# Patient Record
Sex: FEMALE | Race: WHITE | NOT HISPANIC OR LATINO | Employment: OTHER | URBAN - METROPOLITAN AREA
[De-identification: names, ages, dates, MRNs, and addresses within clinical notes are randomized per-mention and may not be internally consistent; named-entity substitution may affect disease eponyms.]

---

## 2021-06-01 ENCOUNTER — OFFICE VISIT (OUTPATIENT)
Dept: GASTROENTEROLOGY | Facility: CLINIC | Age: 71
End: 2021-06-01
Payer: MEDICARE

## 2021-06-01 VITALS
HEART RATE: 56 BPM | WEIGHT: 145 LBS | DIASTOLIC BLOOD PRESSURE: 73 MMHG | SYSTOLIC BLOOD PRESSURE: 122 MMHG | BODY MASS INDEX: 26.68 KG/M2 | HEIGHT: 62 IN

## 2021-06-01 DIAGNOSIS — K44.9 HIATAL HERNIA: ICD-10-CM

## 2021-06-01 DIAGNOSIS — K22.70 BARRETT'S ESOPHAGUS WITHOUT DYSPLASIA: ICD-10-CM

## 2021-06-01 DIAGNOSIS — K63.5 HYPERPLASTIC POLYP OF DESCENDING COLON: ICD-10-CM

## 2021-06-01 DIAGNOSIS — K57.90 DIVERTICULAR DISEASE: Primary | ICD-10-CM

## 2021-06-01 PROCEDURE — 99214 OFFICE O/P EST MOD 30 MIN: CPT | Performed by: INTERNAL MEDICINE

## 2021-06-01 RX ORDER — FLUTICASONE PROPIONATE 50 MCG
1 SPRAY, SUSPENSION (ML) NASAL DAILY
COMMUNITY

## 2021-06-01 RX ORDER — FAMOTIDINE 10 MG
10 TABLET ORAL DAILY
COMMUNITY
End: 2022-02-04 | Stop reason: SDUPTHER

## 2021-06-01 RX ORDER — CETIRIZINE HYDROCHLORIDE 10 MG/1
10 TABLET ORAL DAILY
COMMUNITY

## 2021-06-01 RX ORDER — MONTELUKAST SODIUM 10 MG/1
10 TABLET ORAL
COMMUNITY

## 2021-06-01 RX ORDER — ATORVASTATIN CALCIUM 10 MG/1
10 TABLET, FILM COATED ORAL DAILY
COMMUNITY
Start: 2020-09-10 | End: 2022-02-04 | Stop reason: SINTOL

## 2021-06-01 NOTE — PROGRESS NOTES
[facility] - Outpatient Follow-up Note  Frieda Sullivan 80 y o  female MRN: [id number]  Encounter: [id number]          ASSESSMENT AND PLAN:      1  Diverticular disease    2  Gil's esophagus without dysplasia    3  Hiatal hernia    4  Hyperplastic polyp of descending colon       History of acid reflux well managed on small dose of acid suppression with Pepcid 10 mg a day  Had a small hiatal hernia of small area of Gil's mucosa, will be due for a follow-up EGD next year  Prior EGD colonoscopy biopsy results were reviewed  Continue same, any problems sooner may call   ______________________________________________________________________    SUBJECTIVE:     67-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, history of heartburn acid reflux and indigestion, area small area of Gil's esophagus, switched from PPI to    Pepcid 10 mg in the morning with adequate management of her symptoms  She is aggressively managing her lifestyle and diet  Appetite is fair, denies dysphagia coughing choking spells nocturnal reflux regurgitation bronchitis pneumonias  Denies any fever chills rash chest pains shortness of breath  Reasonably active alert  Diet medications more than 10 pertinent systems reviewed  REVIEW OF SYSTEMS IS OTHERWISE NEGATIVE  Historical Information   Past Medical History:   Diagnosis Date    GERD (gastroesophageal reflux disease)     Nasal polyps      Past Surgical History:   Procedure Laterality Date    CHOLECYSTECTOMY      COLONOSCOPY      UPPER GASTROINTESTINAL ENDOSCOPY       Social History   Social History     Substance and Sexual Activity   Alcohol Use None     Social History     Substance and Sexual Activity   Drug Use Not on file     Social History     Tobacco Use   Smoking Status Former Smoker   Smokeless Tobacco Former User     History reviewed  No pertinent family history      Meds/Allergies       Current Outpatient Medications:     atorvastatin (LIPITOR) 10 mg tablet    budesonide (PULMICORT) 90 MCG/ACT inhaler    cetirizine (ZyrTEC) 10 mg tablet    famotidine (PEPCID) 10 mg tablet    fluticasone (FLONASE) 50 mcg/act nasal spray    montelukast (SINGULAIR) 10 mg tablet    No Known Allergies        Objective     Blood pressure 122/73, pulse 56, height 5' 2" (1 575 m), weight 65 8 kg (145 lb)  Body mass index is 26 52 kg/m²  PHYSICAL EXAM:      General Appearance:   Alert, cooperative, no distress   HEENT:   Normocephalic, atraumatic, anicteric  Neck:  Supple, symmetrical, trachea midline   Lungs:   Clear to auscultation bilaterally; no rales, rhonchi or wheezing; respirations unlabored    Heart[de-identified]   Regular rate and rhythm; no murmur  Abdomen:   Soft, non-tender, non-distended; normal bowel sounds; no masses, no organomegaly    Genitalia:   Deferred    Rectal:   Deferred    Extremities:  No cyanosis, clubbing or edema    Skin:  No jaundice, rashes, or lesions    Lymph nodes:  No palpable cervical lymphadenopathy        Lab Results:   No visits with results within 1 Day(s) from this visit  Latest known visit with results is:   No results found for any previous visit  Radiology Results:   No results found

## 2021-12-30 ENCOUNTER — PREP FOR PROCEDURE (OUTPATIENT)
Dept: GASTROENTEROLOGY | Facility: CLINIC | Age: 71
End: 2021-12-30

## 2021-12-30 DIAGNOSIS — K21.9 GASTROESOPHAGEAL REFLUX DISEASE, UNSPECIFIED WHETHER ESOPHAGITIS PRESENT: ICD-10-CM

## 2021-12-30 DIAGNOSIS — K22.70 BARRETT'S ESOPHAGUS WITHOUT DYSPLASIA: Primary | ICD-10-CM

## 2022-02-04 ENCOUNTER — HOSPITAL ENCOUNTER (OUTPATIENT)
Dept: GASTROENTEROLOGY | Facility: AMBULATORY SURGERY CENTER | Age: 72
Discharge: HOME | End: 2022-02-04
Payer: MEDICARE

## 2022-02-04 ENCOUNTER — ANESTHESIA EVENT (OUTPATIENT)
Dept: GASTROENTEROLOGY | Facility: AMBULATORY SURGERY CENTER | Age: 72
End: 2022-02-04

## 2022-02-04 ENCOUNTER — ANESTHESIA (OUTPATIENT)
Dept: GASTROENTEROLOGY | Facility: AMBULATORY SURGERY CENTER | Age: 72
End: 2022-02-04

## 2022-02-04 VITALS
HEART RATE: 59 BPM | DIASTOLIC BLOOD PRESSURE: 65 MMHG | RESPIRATION RATE: 18 BRPM | HEIGHT: 63 IN | SYSTOLIC BLOOD PRESSURE: 101 MMHG | WEIGHT: 142 LBS | BODY MASS INDEX: 25.16 KG/M2 | OXYGEN SATURATION: 98 % | TEMPERATURE: 95.7 F

## 2022-02-04 DIAGNOSIS — K22.70 BARRETT'S ESOPHAGUS WITHOUT DYSPLASIA: ICD-10-CM

## 2022-02-04 DIAGNOSIS — K21.9 GASTROESOPHAGEAL REFLUX DISEASE, UNSPECIFIED WHETHER ESOPHAGITIS PRESENT: ICD-10-CM

## 2022-02-04 PROCEDURE — 88305 TISSUE EXAM BY PATHOLOGIST: CPT | Performed by: PATHOLOGY

## 2022-02-04 PROCEDURE — 43239 EGD BIOPSY SINGLE/MULTIPLE: CPT | Performed by: INTERNAL MEDICINE

## 2022-02-04 PROCEDURE — 00731 ANES UPR GI NDSC PX NOS: CPT | Performed by: NURSE ANESTHETIST, CERTIFIED REGISTERED

## 2022-02-04 RX ORDER — SODIUM CHLORIDE 9 MG/ML
30 INJECTION, SOLUTION INTRAVENOUS CONTINUOUS
Status: DISCONTINUED | OUTPATIENT
Start: 2022-02-04 | End: 2022-02-08 | Stop reason: HOSPADM

## 2022-02-04 RX ORDER — LIDOCAINE HYDROCHLORIDE 10 MG/ML
INJECTION, SOLUTION EPIDURAL; INFILTRATION; INTRACAUDAL; PERINEURAL AS NEEDED
Status: DISCONTINUED | OUTPATIENT
Start: 2022-02-04 | End: 2022-02-04

## 2022-02-04 RX ORDER — SODIUM CHLORIDE 9 MG/ML
20 INJECTION, SOLUTION INTRAVENOUS CONTINUOUS
Status: DISCONTINUED | OUTPATIENT
Start: 2022-02-04 | End: 2022-02-08 | Stop reason: HOSPADM

## 2022-02-04 RX ORDER — SODIUM CHLORIDE 9 MG/ML
INJECTION, SOLUTION INTRAVENOUS CONTINUOUS PRN
Status: DISCONTINUED | OUTPATIENT
Start: 2022-02-04 | End: 2022-02-04

## 2022-02-04 RX ORDER — ALBUTEROL SULFATE 90 UG/1
2 AEROSOL, METERED RESPIRATORY (INHALATION) EVERY 6 HOURS PRN
COMMUNITY
Start: 2021-09-14

## 2022-02-04 RX ORDER — PROPOFOL 10 MG/ML
INJECTION, EMULSION INTRAVENOUS AS NEEDED
Status: DISCONTINUED | OUTPATIENT
Start: 2022-02-04 | End: 2022-02-04

## 2022-02-04 RX ORDER — FAMOTIDINE 10 MG
TABLET ORAL DAILY
COMMUNITY

## 2022-02-04 RX ORDER — DIPHENOXYLATE HYDROCHLORIDE AND ATROPINE SULFATE 2.5; .025 MG/1; MG/1
1 TABLET ORAL DAILY
COMMUNITY

## 2022-02-04 RX ADMIN — SODIUM CHLORIDE: 9 INJECTION, SOLUTION INTRAVENOUS at 10:50

## 2022-02-04 RX ADMIN — PROPOFOL 50 MG: 10 INJECTION, EMULSION INTRAVENOUS at 10:54

## 2022-02-04 RX ADMIN — PROPOFOL 50 MG: 10 INJECTION, EMULSION INTRAVENOUS at 11:00

## 2022-02-04 RX ADMIN — PROPOFOL 50 MG: 10 INJECTION, EMULSION INTRAVENOUS at 10:57

## 2022-02-04 RX ADMIN — PROPOFOL 50 MG: 10 INJECTION, EMULSION INTRAVENOUS at 10:53

## 2022-02-04 RX ADMIN — PROPOFOL 50 MG: 10 INJECTION, EMULSION INTRAVENOUS at 10:55

## 2022-02-04 RX ADMIN — LIDOCAINE HYDROCHLORIDE 50 MG: 10 INJECTION, SOLUTION EPIDURAL; INFILTRATION; INTRACAUDAL; PERINEURAL at 10:52

## 2022-02-04 NOTE — H&P
History and Physical - [facility]  Nargis Monday 80 y o  female MRN: [id number]                  HPI: Nargis Monday is a 80y o  year old female who presents for history of GERD and Gil's      REVIEW OF SYSTEMS: Per the HPI, and otherwise unremarkable  Historical Information   Past Medical History:   Diagnosis Date    Asthma     Breathing difficulty     Breathing issue in intra op due to asthma- general anes related    Colon polyp     GERD (gastroesophageal reflux disease)     History of Gil's esophagus     Hx of nasal polyp     Nasal polyps     Post-nasal drip      Past Surgical History:   Procedure Laterality Date    BREAST IMPLANT      CHOLECYSTECTOMY      COLONOSCOPY      KNEE SURGERY Right     withhardware= pin    SINUS SURGERY      avoid left nares if nasal airway needed    UPPER GASTROINTESTINAL ENDOSCOPY      WISDOM TOOTH EXTRACTION       Social History   Social History     Substance and Sexual Activity   Alcohol Use Yes    Comment: social- rare     Social History     Substance and Sexual Activity   Drug Use Never     Social History     Tobacco Use   Smoking Status Never Smoker   Smokeless Tobacco Never Used     History reviewed  No pertinent family history      Meds/Allergies       Current Outpatient Medications:     budesonide (PULMICORT) 90 MCG/ACT inhaler    CALCIUM-MAGNESIUM-ZINC PO    cetirizine (ZyrTEC) 10 mg tablet    famotidine (PEPCID) 10 mg tablet    fluticasone (FLONASE) 50 mcg/act nasal spray    montelukast (SINGULAIR) 10 mg tablet    multivitamin (THERAGRAN) TABS    Turmeric (QC TUMERIC COMPLEX PO)    albuterol (PROVENTIL HFA,VENTOLIN HFA) 90 mcg/act inhaler    Current Facility-Administered Medications:     sodium chloride 0 9 % infusion, 30 mL/hr, Intravenous, Continuous    Facility-Administered Medications Ordered in Other Encounters:     lidocaine (PF) (XYLOCAINE-MPF) 1 % injection, , Intravenous, PRN, 50 mg at 02/04/22 4899   sodium chloride 0 9 % infusion, , Intravenous, Continuous PRN, New Bag at 02/04/22 1050    Allergies   Allergen Reactions    Buckwheat - Food Allergy Hives     NEEDS EPI PEN    Atorvastatin Other (See Comments)     Abn LFT    Latex Swelling     OF FACE, TONGUE - UNSURE IF TRULY ANAPHYLAXIS REACTION       Objective     /72   Pulse 72   Temp (!) 95 7 °F (35 4 °C) (Temporal)   Resp 18   Ht 5' 3" (1 6 m)   Wt 64 4 kg (142 lb)   SpO2 98%   BMI 25 15 kg/m²       PHYSICAL EXAM    Gen: NAD  Head: NCAT  CV: RRR  CHEST: Clear  ABD: soft, NT/ND  EXT: no edema      ASSESSMENT/PLAN:  This is a 80y o  year old female here for EGD, and she is stable and optimized for her procedure

## 2022-02-04 NOTE — ANESTHESIA POSTPROCEDURE EVALUATION
Post-Op Assessment Note    CV Status:  Stable  Pain Score: 0    Pain management: adequate     Mental Status:  Alert   Hydration Status:  Stable   PONV Controlled:  None   Airway Patency:  Patent      Post Op Vitals Reviewed: Yes            No complications documented      /65 (02/04/22 1127)    Temp     Pulse 59 (02/04/22 1127)   Resp 18 (02/04/22 1127)    SpO2 98 % (02/04/22 1127)

## 2022-02-04 NOTE — ANESTHESIA PREPROCEDURE EVALUATION
Procedure:  EGD    Relevant Problems   No relevant active problems        Physical Exam    Airway    Mallampati score: I  TM Distance: >3 FB  Neck ROM: full     Dental   No notable dental hx     Cardiovascular  Rhythm: regular, Rate: normal, Cardiovascular exam normal    Pulmonary  Pulmonary exam normal Breath sounds clear to auscultation,     Other Findings        Anesthesia Plan  ASA Score- 2     Anesthesia Type- IV sedation with anesthesia with ASA Monitors  Additional Monitors:   Airway Plan:           Plan Factors-Exercise tolerance (METS): >4 METS  Chart reviewed  Patient is not a current smoker  Induction- intravenous  Postoperative Plan-     Informed Consent- Anesthetic plan and risks discussed with patient

## 2022-02-04 NOTE — DISCHARGE INSTRUCTIONS
Upper Endoscopy   WHAT YOU NEED TO KNOW:   An upper endoscopy is also called an upper gastrointestinal (GI) endoscopy, or an esophagogastroduodenoscopy (EGD)  It is a procedure to examine the inside of your esophagus, stomach, and duodenum (first part of the small intestine) with a scope  You may feel bloated, gassy, or have some abdominal discomfort after your procedure  Your throat may be sore for 24 to 36 hours  You may burp or pass gas from air that is still inside your body  DISCHARGE INSTRUCTIONS:   Seek care immediately if:    You have sudden, severe abdominal pain   You have problems swallowing   You have a large amount of black, sticky bowel movements or blood in your bowel movements   You have sudden trouble breathing   You feel weak, lightheaded, or faint or your heart beats faster than normal for you  Contact your healthcare provider if:    You have a fever and chills   You have nausea or are vomiting   Your abdomen is bloated or feels full and hard   You have abdominal pain   You have black, sticky bowel movements or blood in your bowel movements   You have not had a bowel movement for 3 days after your procedure   You have rash or hives   You have questions or concerns about your procedure  Activity:    Do not lift, strain, or run for 24 hours after your procedure   Rest after your procedure  You have been given medicine to relax you  Do not drive or make important decisions until the day after your procedure  Return to your normal activity as directed   Relieve gas and discomfort from bloating by lying on your right side with a heating pad on your abdomen  You may need to take short walks to help the gas move out  Eat small meals until bloating is relieved  Follow up with your healthcare provider as directed: Write down your questions so you remember to ask them during your visits       If you take a blood thinner, please review the specific instructions from your endoscopist about when you should resume it  These can be found in the Recommendation and Your Medication list sections of this After Visit Summary  Esophagitis   WHAT YOU NEED TO KNOW:   What is esophagitis? Esophagitis is inflammation or irritation of the lining of the esophagus  What causes esophagitis? The most common cause is acid reflux  Reflux means stomach acid backs up into your esophagus  The following can also cause esophagitis:  · An infection from bacteria, a virus, or a fungus    · Vomiting or a hiatal hernia    · Medicines such as aspirin or NSAIDs    · Large pills taken without enough water or right before you go to bed    · Cancer treatment, such as radiation    · A toxic object you swallowed, such as a button battery, that gets stuck in your esophagus    · Too much caffeine or acidic or spicy foods    · Smoking cigarettes    What are the signs and symptoms of esophagitis? Signs and symptoms depend on the cause:  · Pain in the middle of your chest that may spread to your back    · Burning or pain in your esophagus, abdominal pain, or indigestion    · Trouble swallowing, or pain when you swallow    · A feeling that something is stuck in your esophagus    · Sore throat, a cough, or hoarseness    · Gagging, drooling, or wheezing    · Mouth sores (white patches), a bad taste in your mouth, or bad breath    · Nausea or vomiting    · Feeding problems or failure to thrive (young children)    How is esophagitis diagnosed? Your healthcare provider will ask about your symptoms and when they started  Tell him or her if anything makes your symptoms worse or better  You may need any of the following:  · Endoscopy  is used to find any tissue changes  A scope is used to see inside the esophagus  A scope is a long, bendable tube with a light on the end  The scope is placed in your mouth and passed down your throat and esophagus   A camera may be hooked to the scope to take pictures  · A barium swallow x-ray  is used to take pictures inside your esophagus  You will swallow barium in a thick liquid before you have the x-ray  The barium helps any injuries show up better on the x-rays  How is esophagitis treated? The goal of treatment is to help the lining of your esophagus heal and to prevent serious complications  Treatment will depend on what is causing your esophagitis  Symptoms caused by a toxic object such as a button battery need immediate treatment  Less severe causes may not need treatment  You may need any of the following if symptoms continue or get worse:  · Medicines  may be given to fight infection or to control stomach acid  Your healthcare provider may make changes to your medicines, such as changing it to a liquid form  · An elimination diet  may help you find foods that are causing your symptoms  You will stop eating certain foods that can cause esophagitis  Your healthcare provider will tell you to start eating them again one at a time  Each time you do not have symptoms, you will start eating another food from the list  Any food that does cause symptoms may be causing your esophagitis  · Surgery  may be needed if other treatments do not work  Part of your stomach can be wrapped to cover the valve between your stomach and esophagus  This helps prevent acid from backing up into your esophagus  What can I do to manage or prevent esophagitis? · Do not smoke  Nicotine and other chemicals in cigarettes and cigars can irritate and damage your esophagus  Ask your healthcare provider for information if you currently smoke and need help to quit  E-cigarettes or smokeless tobacco still contain nicotine  Talk to your healthcare provider before you use these products  · Do not drink alcohol  Alcohol can irritate your esophagus  Talk to your healthcare provider if you need help to stop drinking      · Limit or do not eat foods that can lead to esophagitis  Foods such as oranges and salsa can irritate your esophagus  Caffeine and chocolate can cause acid reflux  High-fat and fried foods make your stomach digest food more slowly  This increases the amount of stomach acid your esophagus is exposed to  Eat small meals, and drink water with your meals  Soft foods such as yogurt and applesauce may help soothe your throat  Do not eat for at least 3 hours before you go to bed  · Drink more liquid when you take pills  Drink a full glass of water when you take your pills  Ask your healthcare provider if you can take your pills at least an hour before you go to bed  · Prevent acid reflux  Do not bend over unless it is necessary  Acid may back up into your esophagus when you bend over  If possible, keep the head of your bed elevated while you sleep  This will help keep acid from backing up  Manage stress  Stress can make your symptoms worse or cause stomach acid to back up  · Keep batteries and similar objects out of the reach of children  Babies often put items in their mouths to explore them  Button batteries are easy to swallow and can cause serious damage  Keep the battery covers of electronic devices such as remote controls taped closed  Store all batteries and toxic materials where children cannot get to them  Use childproof locks to keep children away from dangerous materials  Call your local emergency number (911 in the [address] [facility]) for any of the following:   · You have any of the following signs of a heart attack:      ? Squeezing, pressure, or pain in your chest    ? You may  also have any of the following:     § Discomfort or pain in your back, neck, jaw, stomach, or arm    § Shortness of breath    § Nausea or vomiting    § Lightheadedness or a sudden cold sweat      When should I seek immediate care? · You feel like you have food stuck in your throat and you cannot cough it out  When should I call my doctor?    · You have new or worsening symptoms, even after treatment  · You have questions or concerns about your condition or care  CARE AGREEMENT:   You have the right to help plan your care  Learn about your health condition and how it may be treated  Discuss treatment options with your healthcare providers to decide what care you want to receive  You always have the right to refuse treatment  The above information is an  only  It is not intended as medical advice for individual conditions or treatments  Talk to your doctor, nurse or pharmacist before following any medical regimen to see if it is safe and effective for you  © Copyright [employer] 2021 Information is for End User's use only and may not be sold, redistributed or otherwise used for commercial purposes  All illustrations and images included in CareNotes® are the copyrighted property of A D A M , Inc  or [facility] [employer]   GERD (Gastroesophageal Reflux Disease)   WHAT YOU NEED TO KNOW:   What is gastroesophageal reflux disease (GERD)? GERD is reflux that occurs more than twice a week for a few weeks  Reflux means acid and food in the stomach back up into the esophagus  It usually causes heartburn and other symptoms  GERD can cause other health problems over time if it is not treated  What causes GERD? GERD often occurs when the lower muscle (sphincter) of the esophagus does not close properly  The sphincter normally opens to let food into the stomach  It then closes to keep food and stomach acid in the stomach  If the sphincter does not close properly, stomach acid and food back up (reflux) into the esophagus   The following may increase your risk for GERD:  · Certain foods such as spicy foods, chocolate, foods that contain caffeine, peppermint, and fried foods    · Hiatal hernia    · Certain medicines such as calcium channel blockers (used to treat high blood pressure), allergy medicines, sedatives, or antidepressants    · Pregnancy or obesity    · Lying down after a meal    · Drinking alcohol or smoking    What are the signs and symptoms of GERD? · Heartburn (burning pain in your chest)    · Pain after meals that spreads to your neck, jaw, or shoulder    · Pain that gets better when you change positions    · Bitter or acid taste in your mouth    · A dry cough    · Trouble swallowing or pain with swallowing    · Hoarseness or a sore throat    · Burping or hiccups    · Feeling full soon after you start eating    How is GERD diagnosed? Your healthcare provider will ask about your symptoms and when they started  Tell him or her about other medical conditions you have, your eating habits, and your activities  You may also need any of the following:  · The amount of acid  in your esophagus and stomach may be checked  A small probe is used to check the amount  · An endoscopy  is a procedure used to look at the inside of your esophagus and stomach  An endoscope is a bendable tube with a light and camera on the end  Your healthcare provider may remove a small sample of tissue and send it to a lab for tests  · X-ray  pictures may be taken of your stomach and intestines (bowel)  You may be given a chalky liquid to drink before the pictures are taken  This liquid helps your stomach and intestines show up better on the x-rays  · Pressure and function  tests of your esophagus can help find problems such as a hiatal hernia  How is GERD treated? · Medicines  are used to decrease stomach acid  Medicine may also be used to help your lower esophageal sphincter and stomach contract (tighten) more  · Surgery  is done to wrap the upper part of the stomach around the esophageal sphincter  This will strengthen the sphincter and prevent reflux  How can I manage GERD? · Do not have foods or drinks that may increase heartburn  These include chocolate, peppermint, fried or fatty foods, drinks that contain caffeine, or carbonated drinks (soda)  Other foods include spicy foods, onions, tomatoes, and tomato-based foods  Do not have foods or drinks that can irritate your esophagus, such as citrus fruits, juices, and alcohol  · Do not eat large meals  When you eat a lot of food at one time, your stomach needs more acid to digest it  Eat 6 small meals each day instead of 3 large ones, and eat slowly  Do not eat meals 2 to 3 hours before bedtime  · Elevate the head of your bed  Place 6-inch blocks under the head of your bed frame  You may also use more than one pillow under your head and shoulders while you sleep  · Maintain a healthy weight  If you are overweight, weight loss may help relieve symptoms of GERD  · Do not smoke  Smoking weakens the lower esophageal sphincter and increases the risk of GERD  Ask your healthcare provider for information if you currently smoke and need help to quit  E-cigarettes or smokeless tobacco still contain nicotine  Talk to your healthcare provider before you use these products  · Do not wear clothing that is tight around your waist   Tight clothing can put pressure on your stomach and cause or worsen GERD symptoms  Call your local emergency number (911 in the [address] [facility]) if:   · You have severe chest pain and sudden trouble breathing  When should I seek immediate care? · You have trouble breathing after you vomit  · You have trouble swallowing, or pain with swallowing  · Your bowel movements are black, bloody, or tarry-looking  · Your vomit looks like coffee grounds or has blood in it  When should I call my doctor? · You feel full and cannot burp or vomit  · You vomit large amounts, or you vomit often  · You are losing weight without trying  · Your symptoms get worse or do not improve with treatment  · You have questions or concerns about your condition or care  CARE AGREEMENT:   You have the right to help plan your care   Learn about your health condition and how it may be treated  Discuss treatment options with your healthcare providers to decide what care you want to receive  You always have the right to refuse treatment  The above information is an  only  It is not intended as medical advice for individual conditions or treatments  Talk to your doctor, nurse or pharmacist before following any medical regimen to see if it is safe and effective for you  © Copyright [employer] 2021 Information is for End User's use only and may not be sold, redistributed or otherwise used for commercial purposes  All illustrations and images included in CareNotes® are the copyrighted property of [employer]  or [employer]or Obregon   Hiatal Hernia   WHAT YOU NEED TO KNOW:   What is a hiatal hernia? A hiatal hernia is a condition that causes part of your stomach to bulge through the hiatus (small opening) in your diaphragm  The part of the stomach may move up and down, or it may get trapped above the diaphragm  What increases my risk for a hiatal hernia? The exact cause of a hiatal hernia is not known  You may have been born with a large hiatus  The following may increase your risk of a hiatal hernia:  · Obesity    · Older age    · Medical conditions such as diverticulosis or esophagitis    · Previous surgery of the esophagus or stomach or trauma such as from a motor vehicle accident    What are the types of hiatal hernia? · Type I (sliding hiatal hernia): A portion of the stomach slides in and out of the hiatus  This type is the most common and usually causes gastroesophageal reflux disease (GERD)  GERD occurs when the esophageal sphincter does not close properly and causes acid reflux  The esophageal sphincter is the lower muscle of the esophagus  · Type II (paraesophageal hiatal hernia):  Type II hiatal hernia forms when a part of the stomach squeezes through the hiatus and lies next to the esophagus      · Type III (combined):  Type III hiatal hernia is a combination of a sliding and a paraesophageal hiatal hernia  · Type IV (complex paraesophageal hiatal hernia): The whole stomach, the small and large bowels, spleen, pancreas, or liver is pushed up into the chest     What are the signs and symptoms of a hiatal hernia? The most common symptom is heartburn  This usually occurs after meals and spreads to your neck, jaw, or shoulder  You may have no signs or symptoms, or you may have any of the following:  · Abdominal pain, especially in the area just above your navel    · Bitter or acid taste in your mouth    · Trouble swallowing    · Coughing or hoarseness    · Chest pain or shortness of breath that occurs after eating    · Frequent burping or hiccups    · Uncomfortable feeling of fullness after eating    How is a hiatal hernia diagnosed? · An upper GI series test  includes x-rays of your esophagus, stomach, and your small intestines  It is also called a barium swallow test  You will be given barium (a chalky liquid) to drink before the pictures are taken  This liquid helps your stomach and intestines show up better on the x-rays  An upper GI series can show if you have an ulcer, a blocked intestine, or other problems  · An endoscopy  uses a scope to see the inside of your digestive tract  A scope is a long, bendable tube with a light on the end of it  A camera may be hooked to the scope to take pictures  How is a hiatal hernia treated? Treatment depends on the type of hiatal hernia you have and on your symptoms  You may not need any treatment  You may need any of the following:  · Medicines  may be given to relieve heartburn symptoms  These medicines help to decrease or block stomach acid  You may also be given medicines that help to tighten the esophageal sphincter  · Surgery  may be done when medicines cannot control your symptoms, or other problems are present  Your healthcare provider may also suggest surgery depending on the type of hernia you have   Your healthcare provider can put your stomach back into its normal location  He or she may make the hiatus (hole) smaller and anchor your stomach in your abdomen  Fundoplication is a surgery that wraps the upper part of the stomach around the esophageal sphincter to strengthen it  How can I manage my symptoms? The following nutrition and lifestyle changes may be recommended to relieve symptoms of heartburn:     · Avoid foods that make your symptoms worse  These may include spicy foods, fruit juices, alcohol, caffeine, chocolate, and mint  · Eat several small meals during the day  Small meals give your stomach less food to digest     · Avoid lying down and bending forward after you eat  Do not eat meals 2 to 3 hours before bedtime  This decreases your risk for reflux  · Maintain a healthy weight  If you are overweight, weight loss may help relieve your symptoms  · Sleep with your head elevated  at least 6 inches  · Do not smoke  Smoking can increase your symptoms of heartburn  Call your local emergency number (911 in the [address] [facility]) if:   · You have severe chest pain and sudden trouble breathing  When should I seek immediate care? · You have severe abdominal pain  · You try to vomit but nothing comes out (retching)  · Your bowel movements are black or bloody  · Your vomit looks like coffee grounds or has blood in it  When should I call my doctor? · Your symptoms are getting worse  · You have nausea, and you are vomiting  · You are losing weight without trying  · You have questions or concerns about your condition or care  CARE AGREEMENT:   You have the right to help plan your care  Learn about your health condition and how it may be treated  Discuss treatment options with your healthcare providers to decide what care you want to receive  You always have the right to refuse treatment  The above information is an  only   It is not intended as medical advice for individual conditions or treatments  Talk to your doctor, nurse or pharmacist before following any medical regimen to see if it is safe and effective for you  © Copyright [employer] 2021 Information is for End User's use only and may not be sold, redistributed or otherwise used for commercial purposes   All illustrations and images included in CareNotes® are the copyrighted property of A D A M , Inc  or [address] [place] [address]

## 2022-02-09 NOTE — RESULT ENCOUNTER NOTE
Please call the patient regarding her abnormal result  Follow up in my office as needed  Repeat EGD in 2 years  Repeat colonoscopy at the same time, please enter recall for both    thank you

## 2022-11-17 ENCOUNTER — TELEPHONE (OUTPATIENT)
Dept: GASTROENTEROLOGY | Facility: CLINIC | Age: 72
End: 2022-11-17

## 2022-11-17 ENCOUNTER — OFFICE VISIT (OUTPATIENT)
Dept: GASTROENTEROLOGY | Facility: CLINIC | Age: 72
End: 2022-11-17

## 2022-11-17 VITALS
WEIGHT: 149 LBS | BODY MASS INDEX: 27.42 KG/M2 | HEART RATE: 96 BPM | SYSTOLIC BLOOD PRESSURE: 117 MMHG | DIASTOLIC BLOOD PRESSURE: 77 MMHG | HEIGHT: 62 IN

## 2022-11-17 DIAGNOSIS — R10.13 EPIGASTRIC PAIN: Primary | ICD-10-CM

## 2022-11-17 DIAGNOSIS — K63.5 HYPERPLASTIC POLYP OF DESCENDING COLON: ICD-10-CM

## 2022-11-17 DIAGNOSIS — K22.70 BARRETT'S ESOPHAGUS WITHOUT DYSPLASIA: ICD-10-CM

## 2022-11-17 RX ORDER — OMEPRAZOLE 40 MG/1
CAPSULE, DELAYED RELEASE ORAL
COMMUNITY
Start: 2022-11-09

## 2022-11-17 NOTE — H&P (VIEW-ONLY)
[facility] - Outpatient Follow-up Note  Cody Melendez 71 y o  female MRN: [id number]  Encounter: [id number]          ASSESSMENT AND PLAN:      1  Epigastric pain  Patient with epigastric pain, will plan for ultrasound since there was some mild prominence of the intra and extrahepatic ducts although LFTs were normal we will order ultrasound for further evaluation  If significant dilatation or increased dilatation may need MRCP for further evaluation  We will repeat CBC and will order lipase due to the epigastric pain  Advised her to try to get this testing done as soon as possible and we will plan for EGD as well next week for further evaluation  In the interim she has been taking Pepcid as needed and she can continue the same   - CBC and differential; Future  - Lipase; Future  - US right upper quadrant; Future  - EGD; Future    2  Gil's esophagus without dysplasia  Could not tolerate PPI due to side effects in the past and last EGD had shown no evidence of intestinal metaplasia but we will repeat EGD for surveillance biopsies in 2024, but EGD has been planned for further evaluation at this time with epigastric pain    3  Hyperplastic polyp of descending colon  Colonoscopy will be due in early 2024 for history of colon polyps    ______________________________________________________________________    SUBJECTIVE:      This is a 71-year-old female who presents today with complaints of epigastric pain  Patient reports that this has been going on for about 3 weeks and she went to see her primary doctor and had a CT as well as a CMP and a CBC  CT was reviewed which had shown some mild prominence of the intra and extrahepatic ducts, but does not mention size  No details of her stomach were mentioned  Had EGD earlier this year which had shown some distal esophageal erosion and small hiatal hernia  Denies any regular NSAID use    Pain is in the epigastric area and does not radiate but she reports that this was similar to her gallbladder pain before she had cholecystectomy  Of note LFTs are within normal limits  Noted to have mild leukocytosis of 12 3  Patient denies any recent gastroenteritis symptoms, denies any new medication  Patient was prescribed omeprazole by her primary doctor but she was having negative side effects with the pantoprazole such as swelling so the site to be discontinued so she is hesitant to take this without a diagnosis  She otherwise reports that she is had no change in bowel habits  Colonoscopy was performed in 2019 which revealed hyperplastic polyp and recommended to be done in 2024 along with a repeat EGD she does have history of Gil's although last biopsies were negative for intestinal metaplasia  Patient is going on a trip early December and is concerned  Luis Scherer REVIEW OF SYSTEMS IS OTHERWISE NEGATIVE  Historical Information   Past Medical History:   Diagnosis Date   • Asthma    • Breathing difficulty     Breathing issue in intra op due to asthma- general anes related   • Colon polyp    • GERD (gastroesophageal reflux disease)    • History of Gil's esophagus    • Hx of nasal polyp    • Nasal polyps    • Post-nasal drip      Past Surgical History:   Procedure Laterality Date   • BREAST IMPLANT     • CHOLECYSTECTOMY     • COLONOSCOPY     • KNEE SURGERY Right     withhardware= pin   • SINUS SURGERY      avoid left nares if nasal airway needed   • UPPER GASTROINTESTINAL ENDOSCOPY     • WISDOM TOOTH EXTRACTION       Social History   Social History     Substance and Sexual Activity   Alcohol Use Yes    Comment: social- rare     Social History     Substance and Sexual Activity   Drug Use Never     Social History     Tobacco Use   Smoking Status Never   Smokeless Tobacco Never     History reviewed  No pertinent family history      Meds/Allergies       Current Outpatient Medications:   •  albuterol (PROVENTIL HFA,VENTOLIN HFA) 90 mcg/act inhaler  • budesonide (PULMICORT) 90 MCG/ACT inhaler  •  CALCIUM-MAGNESIUM-ZINC PO  •  cetirizine (ZyrTEC) 10 mg tablet  •  famotidine (PEPCID) 10 mg tablet  •  fluticasone (FLONASE) 50 mcg/act nasal spray  •  montelukast (SINGULAIR) 10 mg tablet  •  multivitamin (THERAGRAN) TABS  •  omeprazole (PriLOSEC) 40 MG capsule  •  Turmeric (QC TUMERIC COMPLEX PO)    Allergies   Allergen Reactions   • Buckwheat - Food Allergy Hives     NEEDS EPI PEN   • Atorvastatin Other (See Comments)     Abn LFT   • Latex Swelling     OF FACE, TONGUE - UNSURE IF TRULY ANAPHYLAXIS REACTION           Objective     Blood pressure 117/77, pulse 96, height 5' 1 5" (1 562 m), weight 67 6 kg (149 lb)  Body mass index is 27 7 kg/m²  PHYSICAL EXAM:      General Appearance:   Alert, cooperative, no distress   HEENT:   Normocephalic, atraumatic, anicteric      Neck:  Supple, symmetrical, trachea midline   Lungs:   Clear to auscultation bilaterally; no rales, rhonchi or wheezing; respirations unlabored    Heart[de-identified]   Regular rate and rhythm; no murmur, rub, or gallop  Abdomen:   Soft, positive epigastric tenderness to palpation, non-distended; normal bowel sounds; no masses, no organomegaly    Genitalia:   Deferred    Rectal:   Deferred    Extremities:  No cyanosis, clubbing or edema    Pulses:  2+ and symmetric    Skin:  No jaundice, rashes, or lesions    Lymph nodes:  No palpable cervical lymphadenopathy        Lab Results:   No visits with results within 1 Day(s) from this visit     Latest known visit with results is:   Hospital Outpatient Visit on 02/04/2022   Component Date Value   • Case Report 02/04/2022                      Value:Surgical Pathology Report                         Case: [id number]                                   Authorizing Provider:  Coty Herrera MD           Collected:           02/04/2022 1102              Ordering Location:     [address] [place] [address] Received:            02/04/2022 2054 [place]                                                                  Pathologist:           Zechariah Quezada MD                                                         Specimen:    Esophagus, cold bx lower esophagus hx of barretts                                         • Final Diagnosis 02/04/2022                      Value: This result contains rich text formatting which cannot be displayed here  • Additional Information 02/04/2022                      Value: This result contains rich text formatting which cannot be displayed here  • Gross Description 02/04/2022                      Value: This result contains rich text formatting which cannot be displayed here  Radiology Results:   No results found

## 2022-11-17 NOTE — TELEPHONE ENCOUNTER
Scheduled date of EGD(as of today): 11/21/22  Physician performing EGD: Dr Jacinto Mendosa   Location of EGD: [facility]  Instructions reviewed with patient by: juan josé  Clearances: n /a

## 2022-11-17 NOTE — TELEPHONE ENCOUNTER
[facility] Assessment    Name: Lux Anderson  YOB: 1950  Last Height: 5' 1 5" (1 562 m)  Last weight: 67 6 kg (149 lb)  BMI: 27 70 kg/m²  Procedure: Egd  Diagnosis: see order  Date of procedure: 11/21/22  Prep:   Responsible : yes  Phone#: [phone]  Name completing form: Buck [place]  Date form completed: 11/17/22      If the patient answers yes to any of these questions, schedule in a hospital  Are you pregnant: No  Do you rely on a wheelchair for mobility: No  Have you been diagnosed with End Stage Renal Disease (ESRD): No  Do you need oxygen during the day: No  Have you had a heart attack or stroke within the past three months: No  Have you had a seizure within the past three months: No  Have you ever been informed by anesthesia that you have a difficult airway: No  Additional Questions  Have you had any cardiac testing or are under the care of a Cardiologist (see cardiac list): No  Cardiac list:   Do you have an implanted cardiac defibrillator: No (Comment:  This patient should be scheduled in the hospital)    Have any bleeding problems, such as anemia or hemophilia (If patient has H&H result below 8, schedule in hospital   H&H must be within 30 days of procedure): No    Had an organ transplant within the past 3 months: No    Do you have any present infections: No  Do you get short of breath when walking a few blocks: No  Have you been diagnosed with diabetes: No  Comments (provide cardiac provider information if applicable):

## 2022-11-17 NOTE — PROGRESS NOTES
[facility] - Outpatient Follow-up Note  Martha Hopkins 71 y o  female MRN: [id number]  Encounter: [id number]          ASSESSMENT AND PLAN:      1  Epigastric pain  Patient with epigastric pain, will plan for ultrasound since there was some mild prominence of the intra and extrahepatic ducts although LFTs were normal we will order ultrasound for further evaluation  If significant dilatation or increased dilatation may need MRCP for further evaluation  We will repeat CBC and will order lipase due to the epigastric pain  Advised her to try to get this testing done as soon as possible and we will plan for EGD as well next week for further evaluation  In the interim she has been taking Pepcid as needed and she can continue the same   - CBC and differential; Future  - Lipase; Future  - US right upper quadrant; Future  - EGD; Future    2  Gil's esophagus without dysplasia  Could not tolerate PPI due to side effects in the past and last EGD had shown no evidence of intestinal metaplasia but we will repeat EGD for surveillance biopsies in 2024, but EGD has been planned for further evaluation at this time with epigastric pain    3  Hyperplastic polyp of descending colon  Colonoscopy will be due in early 2024 for history of colon polyps    ______________________________________________________________________    SUBJECTIVE:      This is a 67-year-old female who presents today with complaints of epigastric pain  Patient reports that this has been going on for about 3 weeks and she went to see her primary doctor and had a CT as well as a CMP and a CBC  CT was reviewed which had shown some mild prominence of the intra and extrahepatic ducts, but does not mention size  No details of her stomach were mentioned  Had EGD earlier this year which had shown some distal esophageal erosion and small hiatal hernia  Denies any regular NSAID use    Pain is in the epigastric area and does not radiate but she reports that this was similar to her gallbladder pain before she had cholecystectomy  Of note LFTs are within normal limits  Noted to have mild leukocytosis of 12 3  Patient denies any recent gastroenteritis symptoms, denies any new medication  Patient was prescribed omeprazole by her primary doctor but she was having negative side effects with the pantoprazole such as swelling so the site to be discontinued so she is hesitant to take this without a diagnosis  She otherwise reports that she is had no change in bowel habits  Colonoscopy was performed in 2019 which revealed hyperplastic polyp and recommended to be done in 2024 along with a repeat EGD she does have history of Gil's although last biopsies were negative for intestinal metaplasia  Patient is going on a trip early December and is concerned  Sarah Sales REVIEW OF SYSTEMS IS OTHERWISE NEGATIVE  Historical Information   Past Medical History:   Diagnosis Date   • Asthma    • Breathing difficulty     Breathing issue in intra op due to asthma- general anes related   • Colon polyp    • GERD (gastroesophageal reflux disease)    • History of Igl's esophagus    • Hx of nasal polyp    • Nasal polyps    • Post-nasal drip      Past Surgical History:   Procedure Laterality Date   • BREAST IMPLANT     • CHOLECYSTECTOMY     • COLONOSCOPY     • KNEE SURGERY Right     withhardware= pin   • SINUS SURGERY      avoid left nares if nasal airway needed   • UPPER GASTROINTESTINAL ENDOSCOPY     • WISDOM TOOTH EXTRACTION       Social History   Social History     Substance and Sexual Activity   Alcohol Use Yes    Comment: social- rare     Social History     Substance and Sexual Activity   Drug Use Never     Social History     Tobacco Use   Smoking Status Never   Smokeless Tobacco Never     History reviewed  No pertinent family history      Meds/Allergies       Current Outpatient Medications:   •  albuterol (PROVENTIL HFA,VENTOLIN HFA) 90 mcg/act inhaler  • budesonide (PULMICORT) 90 MCG/ACT inhaler  •  CALCIUM-MAGNESIUM-ZINC PO  •  cetirizine (ZyrTEC) 10 mg tablet  •  famotidine (PEPCID) 10 mg tablet  •  fluticasone (FLONASE) 50 mcg/act nasal spray  •  montelukast (SINGULAIR) 10 mg tablet  •  multivitamin (THERAGRAN) TABS  •  omeprazole (PriLOSEC) 40 MG capsule  •  Turmeric (QC TUMERIC COMPLEX PO)    Allergies   Allergen Reactions   • Buckwheat - Food Allergy Hives     NEEDS EPI PEN   • Atorvastatin Other (See Comments)     Abn LFT   • Latex Swelling     OF FACE, TONGUE - UNSURE IF TRULY ANAPHYLAXIS REACTION           Objective     Blood pressure 117/77, pulse 96, height 5' 1 5" (1 562 m), weight 67 6 kg (149 lb)  Body mass index is 27 7 kg/m²  PHYSICAL EXAM:      General Appearance:   Alert, cooperative, no distress   HEENT:   Normocephalic, atraumatic, anicteric      Neck:  Supple, symmetrical, trachea midline   Lungs:   Clear to auscultation bilaterally; no rales, rhonchi or wheezing; respirations unlabored    Heart[de-identified]   Regular rate and rhythm; no murmur, rub, or gallop  Abdomen:   Soft, positive epigastric tenderness to palpation, non-distended; normal bowel sounds; no masses, no organomegaly    Genitalia:   Deferred    Rectal:   Deferred    Extremities:  No cyanosis, clubbing or edema    Pulses:  2+ and symmetric    Skin:  No jaundice, rashes, or lesions    Lymph nodes:  No palpable cervical lymphadenopathy        Lab Results:   No visits with results within 1 Day(s) from this visit     Latest known visit with results is:   Hospital Outpatient Visit on 02/04/2022   Component Date Value   • Case Report 02/04/2022                      Value:Surgical Pathology Report                         Case: [id number]                                   Authorizing Provider:  Jeni Tse MD           Collected:           02/04/2022 1102              Ordering Location:     [address] [place] [address] Received:            02/04/2022 2059 [place]                                                                  Pathologist:           Adore Sargent MD                                                         Specimen:    Esophagus, cold bx lower esophagus hx of barretts                                         • Final Diagnosis 02/04/2022                      Value: This result contains rich text formatting which cannot be displayed here  • Additional Information 02/04/2022                      Value: This result contains rich text formatting which cannot be displayed here  • Gross Description 02/04/2022                      Value: This result contains rich text formatting which cannot be displayed here  Radiology Results:   No results found

## 2022-11-21 ENCOUNTER — HOSPITAL ENCOUNTER (OUTPATIENT)
Dept: GASTROENTEROLOGY | Facility: AMBULATORY SURGERY CENTER | Age: 72
Discharge: HOME | End: 2022-11-21

## 2022-11-21 ENCOUNTER — ANESTHESIA EVENT (OUTPATIENT)
Dept: GASTROENTEROLOGY | Facility: AMBULATORY SURGERY CENTER | Age: 72
End: 2022-11-21

## 2022-11-21 ENCOUNTER — ANESTHESIA (OUTPATIENT)
Dept: GASTROENTEROLOGY | Facility: AMBULATORY SURGERY CENTER | Age: 72
End: 2022-11-21

## 2022-11-21 VITALS
WEIGHT: 149 LBS | SYSTOLIC BLOOD PRESSURE: 104 MMHG | HEIGHT: 61 IN | RESPIRATION RATE: 18 BRPM | HEART RATE: 68 BPM | DIASTOLIC BLOOD PRESSURE: 68 MMHG | BODY MASS INDEX: 28.13 KG/M2 | OXYGEN SATURATION: 98 % | TEMPERATURE: 96.5 F

## 2022-11-21 DIAGNOSIS — R10.13 EPIGASTRIC PAIN: ICD-10-CM

## 2022-11-21 PROBLEM — J45.909 ASTHMA: Status: ACTIVE | Noted: 2022-11-21

## 2022-11-21 PROBLEM — K21.9 GASTROESOPHAGEAL REFLUX DISEASE: Status: ACTIVE | Noted: 2022-11-21

## 2022-11-21 RX ORDER — PROPOFOL 10 MG/ML
INJECTION, EMULSION INTRAVENOUS AS NEEDED
Status: DISCONTINUED | OUTPATIENT
Start: 2022-11-21 | End: 2022-11-21

## 2022-11-21 RX ORDER — LIDOCAINE HYDROCHLORIDE 10 MG/ML
INJECTION, SOLUTION EPIDURAL; INFILTRATION; INTRACAUDAL; PERINEURAL AS NEEDED
Status: DISCONTINUED | OUTPATIENT
Start: 2022-11-21 | End: 2022-11-21

## 2022-11-21 RX ADMIN — PROPOFOL 20 MG: 10 INJECTION, EMULSION INTRAVENOUS at 13:36

## 2022-11-21 RX ADMIN — PROPOFOL 100 MG: 10 INJECTION, EMULSION INTRAVENOUS at 13:29

## 2022-11-21 RX ADMIN — PROPOFOL 20 MG: 10 INJECTION, EMULSION INTRAVENOUS at 13:39

## 2022-11-21 RX ADMIN — PROPOFOL 20 MG: 10 INJECTION, EMULSION INTRAVENOUS at 13:33

## 2022-11-21 RX ADMIN — LIDOCAINE HYDROCHLORIDE 100 MG: 10 INJECTION, SOLUTION EPIDURAL; INFILTRATION; INTRACAUDAL; PERINEURAL at 13:29

## 2022-11-21 NOTE — ANESTHESIA PREPROCEDURE EVALUATION
Procedure:  EGD    Relevant Problems   GI/HEPATIC   (+) Gastroesophageal reflux disease      PULMONARY   (+) Asthma        Physical Exam    Airway    Mallampati score: II  TM Distance: >3 FB  Neck ROM: full     Dental   No notable dental hx     Cardiovascular  Cardiovascular exam normal    Pulmonary  Pulmonary exam normal     Other Findings        Anesthesia Plan  ASA Score- 2     Anesthesia Type- IV sedation with anesthesia with ASA Monitors  Additional Monitors:   Airway Plan:           Plan Factors-Exercise tolerance (METS): >4 METS  Chart reviewed  Imaging results reviewed  Existing labs reviewed  Patient summary reviewed  Patient is not a current smoker  Obstructive sleep apnea risk education given perioperatively  Induction-     Postoperative Plan-     Informed Consent- Anesthetic plan and risks discussed with patient  I personally reviewed this patient with the CRNA  Discussed and agreed on the Anesthesia Plan with the CRNA  Anupama Ball full range of motion , no edema

## 2022-11-21 NOTE — ANESTHESIA POSTPROCEDURE EVALUATION
Post-Op Assessment Note    CV Status:  Stable  Pain Score: 0    Pain management: adequate     Mental Status:  Alert and awake   Hydration Status:  Euvolemic   PONV Controlled:  Controlled   Airway Patency:  Patent      Post Op Vitals Reviewed: Yes      Staff: CRNA         No notable events documented      BP   116/59   Temp     Pulse  70   Resp   12   SpO2   97ra

## 2022-11-21 NOTE — INTERVAL H&P NOTE
H&P reviewed  After examining the patient I find no changes in the patients condition since the H&P had been written      Vitals:    11/21/22 1316   BP: 123/61   Pulse: 65   Resp: 18   Temp: (!) 96 5 °F (35 8 °C)   SpO2: 98%

## 2023-12-07 ENCOUNTER — TELEPHONE (OUTPATIENT)
Dept: GASTROENTEROLOGY | Facility: CLINIC | Age: 73
End: 2023-12-07

## 2023-12-07 NOTE — TELEPHONE ENCOUNTER
Calm Pt is due for an EGD - hx of Gil's and a Colonoscopy - hx of polyps with Dr. Geneva Candelaria the beginning of February 2024. I lmom for pt to please call back to schedule. Will call pt again if do not hear back from her.

## 2023-12-08 ENCOUNTER — PREP FOR PROCEDURE (OUTPATIENT)
Age: 73
End: 2023-12-08

## 2023-12-08 DIAGNOSIS — K22.70 BARRETT'S ESOPHAGUS WITHOUT DYSPLASIA: ICD-10-CM

## 2023-12-08 DIAGNOSIS — Z86.010 HISTORY OF COLON POLYPS: Primary | ICD-10-CM

## 2023-12-08 NOTE — TELEPHONE ENCOUNTER
Scheduled date of colonoscopy (as of today): 2/19   Physician performing colonoscopy: Geneva Candelaria  Location of colonoscopy: [facility]  Bowel prep reviewed with patient: ROXY/SANGEETA  Instructions reviewed with patient by: TORI  Clearances: NONE

## 2023-12-08 NOTE — TELEPHONE ENCOUNTER
Pt returned office call to [facility] PROCEDURES     Pre- Screening: RECALL     There is no height or weight on file to calculate BMI. 25.8      SCHEDULING STAFF: If the patient is between 45yrs-49yrs, please advise patient to confirm benefits/coverage with their insurance company for a routine screening colonoscopy, some insurance carriers will only cover at [facility] or older. If the patient is over 66years old, please schedule an office visit. Does the patient want to see a Gastroenterologist prior to their procedure OR are they having any GI symptoms? no    Has the patient been hospitalized or had abdominal surgery in the past 6 months? no    Does the patient use supplemental oxygen? no    Does the patient take Coumadin, Lovenox, Plavix, Elliquis, Xarelto, or other blood thinning medication? no    Has the patient had a stroke, cardiac event, or stent placed in the past year? no    PT PASS OA     SCHEDULING STAFF: If patient answers NO to above questions, then schedule procedure. If patient answers YES to above questions, then schedule office appointment. If patient is between 45yrs - 49yrs, please advise patient that we will have to confirm benefits & coverage with their insurance company for a routine screening colonoscopy.

## 2024-02-05 ENCOUNTER — ANESTHESIA EVENT (OUTPATIENT)
Dept: ANESTHESIOLOGY | Facility: HOSPITAL | Age: 74
End: 2024-02-05

## 2024-02-05 ENCOUNTER — ANESTHESIA (OUTPATIENT)
Dept: ANESTHESIOLOGY | Facility: HOSPITAL | Age: 74
End: 2024-02-05

## 2024-02-12 ENCOUNTER — TELEPHONE (OUTPATIENT)
Dept: GASTROENTEROLOGY | Facility: CLINIC | Age: 74
End: 2024-02-12

## 2024-02-12 NOTE — TELEPHONE ENCOUNTER
Spoke to pt confirming pt's colonoscopy/egd scheduled on 2/19/24 at [facility] with Dr Saab.  Informed [facility] would be calling the day prior with the arrival time.  Informed of clear liquid diet day prior as well as the bowel cleansing preparation.  Informed would need a  the day of the procedure due to being under sedation. Pt has instructions and did not have any questions.

## 2024-02-17 RX ORDER — SODIUM CHLORIDE, SODIUM LACTATE, POTASSIUM CHLORIDE, CALCIUM CHLORIDE 600; 310; 30; 20 MG/100ML; MG/100ML; MG/100ML; MG/100ML
75 INJECTION, SOLUTION INTRAVENOUS CONTINUOUS
Status: CANCELLED | OUTPATIENT
Start: 2024-02-18

## 2024-02-19 ENCOUNTER — ANESTHESIA EVENT (OUTPATIENT)
Dept: GASTROENTEROLOGY | Facility: AMBULARY SURGERY CENTER | Age: 74
End: 2024-02-19

## 2024-02-19 ENCOUNTER — HOSPITAL ENCOUNTER (OUTPATIENT)
Dept: GASTROENTEROLOGY | Facility: AMBULARY SURGERY CENTER | Age: 74
Setting detail: OUTPATIENT SURGERY
Discharge: HOME | End: 2024-02-19
Attending: INTERNAL MEDICINE
Payer: MEDICARE

## 2024-02-19 ENCOUNTER — ANESTHESIA (OUTPATIENT)
Dept: GASTROENTEROLOGY | Facility: AMBULARY SURGERY CENTER | Age: 74
End: 2024-02-19

## 2024-02-19 VITALS
RESPIRATION RATE: 16 BRPM | SYSTOLIC BLOOD PRESSURE: 122 MMHG | HEART RATE: 74 BPM | OXYGEN SATURATION: 100 % | DIASTOLIC BLOOD PRESSURE: 68 MMHG | TEMPERATURE: 98 F

## 2024-02-19 DIAGNOSIS — Z86.010 HISTORY OF COLON POLYPS: ICD-10-CM

## 2024-02-19 DIAGNOSIS — K21.9 GASTROESOPHAGEAL REFLUX DISEASE, UNSPECIFIED WHETHER ESOPHAGITIS PRESENT: Primary | ICD-10-CM

## 2024-02-19 DIAGNOSIS — K21.9 GASTROESOPHAGEAL REFLUX DISEASE, UNSPECIFIED WHETHER ESOPHAGITIS PRESENT: ICD-10-CM

## 2024-02-19 DIAGNOSIS — K22.70 BARRETT'S ESOPHAGUS WITHOUT DYSPLASIA: ICD-10-CM

## 2024-02-19 PROCEDURE — 88341 IMHCHEM/IMCYTCHM EA ADD ANTB: CPT | Performed by: PATHOLOGY

## 2024-02-19 PROCEDURE — 88312 SPECIAL STAINS GROUP 1: CPT | Performed by: PATHOLOGY

## 2024-02-19 PROCEDURE — 88313 SPECIAL STAINS GROUP 2: CPT | Performed by: PATHOLOGY

## 2024-02-19 PROCEDURE — 88305 TISSUE EXAM BY PATHOLOGIST: CPT | Performed by: PATHOLOGY

## 2024-02-19 PROCEDURE — 88342 IMHCHEM/IMCYTCHM 1ST ANTB: CPT | Performed by: PATHOLOGY

## 2024-02-19 RX ORDER — SODIUM CHLORIDE, SODIUM LACTATE, POTASSIUM CHLORIDE, CALCIUM CHLORIDE 600; 310; 30; 20 MG/100ML; MG/100ML; MG/100ML; MG/100ML
75 INJECTION, SOLUTION INTRAVENOUS CONTINUOUS
Status: DISCONTINUED | OUTPATIENT
Start: 2024-02-19 | End: 2024-02-23 | Stop reason: HOSPADM

## 2024-02-19 RX ORDER — SODIUM CHLORIDE, SODIUM LACTATE, POTASSIUM CHLORIDE, CALCIUM CHLORIDE 600; 310; 30; 20 MG/100ML; MG/100ML; MG/100ML; MG/100ML
INJECTION, SOLUTION INTRAVENOUS CONTINUOUS PRN
Status: DISCONTINUED | OUTPATIENT
Start: 2024-02-19 | End: 2024-02-19

## 2024-02-19 RX ORDER — PROPOFOL 10 MG/ML
INJECTION, EMULSION INTRAVENOUS AS NEEDED
Status: DISCONTINUED | OUTPATIENT
Start: 2024-02-19 | End: 2024-02-19

## 2024-02-19 RX ORDER — LIDOCAINE HYDROCHLORIDE 10 MG/ML
INJECTION, SOLUTION EPIDURAL; INFILTRATION; INTRACAUDAL; PERINEURAL AS NEEDED
Status: DISCONTINUED | OUTPATIENT
Start: 2024-02-19 | End: 2024-02-19

## 2024-02-19 RX ORDER — PANTOPRAZOLE SODIUM 40 MG/1
40 TABLET, DELAYED RELEASE ORAL DAILY
Qty: 30 TABLET | Refills: 5 | Status: SHIPPED | OUTPATIENT
Start: 2024-02-19 | End: 2024-02-21

## 2024-02-19 RX ADMIN — PROPOFOL 150 MG: 10 INJECTION, EMULSION INTRAVENOUS at 10:36

## 2024-02-19 RX ADMIN — PROPOFOL 50 MG: 10 INJECTION, EMULSION INTRAVENOUS at 10:50

## 2024-02-19 RX ADMIN — PROPOFOL 50 MG: 10 INJECTION, EMULSION INTRAVENOUS at 10:38

## 2024-02-19 RX ADMIN — PROPOFOL 50 MG: 10 INJECTION, EMULSION INTRAVENOUS at 10:46

## 2024-02-19 RX ADMIN — PROPOFOL 50 MG: 10 INJECTION, EMULSION INTRAVENOUS at 10:54

## 2024-02-19 RX ADMIN — PROPOFOL 50 MG: 10 INJECTION, EMULSION INTRAVENOUS at 10:42

## 2024-02-19 RX ADMIN — LIDOCAINE HYDROCHLORIDE 50 MG: 10 INJECTION, SOLUTION EPIDURAL; INFILTRATION; INTRACAUDAL; PERINEURAL at 10:36

## 2024-02-19 RX ADMIN — SODIUM CHLORIDE, SODIUM LACTATE, POTASSIUM CHLORIDE, AND CALCIUM CHLORIDE: .6; .31; .03; .02 INJECTION, SOLUTION INTRAVENOUS at 10:11

## 2024-02-19 RX ADMIN — PROPOFOL 50 MG: 10 INJECTION, EMULSION INTRAVENOUS at 10:57

## 2024-02-19 NOTE — H&P
History and Physical -  [facility]  Lula Nguyen 73 y.o. female MRN: [id number]                  HPI: Lula Nguyen is a 73 y.o. year old female who presents for 3 of GERD and polyp      REVIEW OF SYSTEMS: Per the HPI, and otherwise unremarkable.    Historical Information   Past Medical History:   Diagnosis Date    Asthma     Breathing difficulty     Breathing issue in intra op due to asthma- general anes related    Colon polyp     Epigastric pain     GERD (gastroesophageal reflux disease)     History of Gil's esophagus     Hx of nasal polyp     Nasal polyps     Post-nasal drip      Past Surgical History:   Procedure Laterality Date    BREAST IMPLANT      CHOLECYSTECTOMY      COLONOSCOPY      KNEE SURGERY Right     withhardware= pin    SINUS SURGERY      avoid left nares if nasal airway needed    SKIN BIOPSY      UPPER GASTROINTESTINAL ENDOSCOPY      WISDOM TOOTH EXTRACTION       Social History   Social History     Substance and Sexual Activity   Alcohol Use Yes    Comment: social- rare     Social History     Substance and Sexual Activity   Drug Use Never     Social History     Tobacco Use   Smoking Status Never   Smokeless Tobacco Never     History reviewed. No pertinent family history.    Meds/Allergies       Current Outpatient Medications:     budesonide (PULMICORT) 90 MCG/ACT inhaler    CALCIUM-MAGNESIUM-ZINC PO    famotidine (PEPCID) 10 mg tablet    fluticasone (FLONASE) 50 mcg/act nasal spray    montelukast (SINGULAIR) 10 mg tablet    multivitamin (THERAGRAN) TABS    albuterol (PROVENTIL HFA,VENTOLIN HFA) 90 mcg/act inhaler    Current Facility-Administered Medications:     lactated ringers infusion, 75 mL/hr, Intravenous, Continuous    Facility-Administered Medications Ordered in Other Encounters:     lactated ringers infusion, , Intravenous, Continuous PRN, New Bag at 02/19/24 1011    Allergies   Allergen Reactions    Buckwheat - Food Allergy Hives     NEEDS EPI PEN     Atorvastatin Other (See Comments)     Abn LFT    Latex Swelling     OF FACE, TONGUE - UNSURE IF TRULY ANAPHYLAXIS REACTION       Objective     /73   Pulse 87   Temp 98 °F (36.7 °C) (Temporal)   Resp 16   SpO2 97%       PHYSICAL EXAM    Gen: NAD  Head: NCAT  CV: RRR  CHEST: Clear  ABD: soft, NT/ND  EXT: no edema      ASSESSMENT/PLAN:  This is a 73 y.o. year old female here for EGD colonoscopy, and she is stable and optimized for her procedure.

## 2024-02-19 NOTE — ANESTHESIA PREPROCEDURE EVALUATION
Procedure:  COLONOSCOPY  EGD    Relevant Problems   ANESTHESIA (within normal limits)   (-) History of anesthesia complications      CARDIO (within normal limits)      ENDO (within normal limits)      GI/HEPATIC   (+) Gastroesophageal reflux disease      /RENAL (within normal limits)      GYN (within normal limits)      HEMATOLOGY (within normal limits)      MUSCULOSKELETAL (within normal limits)      NEURO/PSYCH (within normal limits)      PULMONARY   (+) Asthma        Physical Exam    Airway    Mallampati score: II  TM Distance: >3 FB  Neck ROM: full     Dental   No notable dental hx     Cardiovascular  Rhythm: regular, Rate: normal, Cardiovascular exam normal    Pulmonary  Pulmonary exam normal Breath sounds clear to auscultation    Other Findings  post-pubertal.      Anesthesia Plan  ASA Score- 2     Anesthesia Type- IV sedation with anesthesia with ASA Monitors.         Additional Monitors:     Airway Plan:            Plan Factors-Exercise tolerance (METS): >4 METS.    Chart reviewed.  Imaging results reviewed. Existing labs reviewed. Patient summary reviewed.    Patient is not a current smoker.  Patient did not smoke on day of surgery.            Induction- intravenous.    Postoperative Plan-     Informed Consent- Anesthetic plan and risks discussed with patient.  I personally reviewed this patient with the CRNA. Discussed and agreed on the Anesthesia Plan with the CRNA..            NPO and allergies verified.  Patient used Pulmicort and Flonase this morning, 2/19/24.    Plan:  IV sedation, GA backup    Benefits and risks of sedation were discussed with the patient including possibility of recall under sedation and the potential for conversion to general anesthesia if necessary.  All questions were answered.  Anesthesia consent was obtained from the patient.

## 2024-02-19 NOTE — ANESTHESIA POSTPROCEDURE EVALUATION
Post-Op Assessment Note    CV Status:  Stable  Pain Score: 0    Pain management: adequate       Mental Status:  Alert and awake   Hydration Status:  Euvolemic   PONV Controlled:  Controlled   Airway Patency:  Patent     Post Op Vitals Reviewed: Yes    No anethesia notable event occurred.    Staff: Anesthesiologist, CRNA               BP 99/54 (02/19/24 1107)    Temp      Pulse 81 (02/19/24 1107)   Resp 16 (02/19/24 1107)    SpO2 97 % (02/19/24 1107)

## 2024-02-21 PROCEDURE — 88305 TISSUE EXAM BY PATHOLOGIST: CPT | Performed by: PATHOLOGY

## 2024-02-21 PROCEDURE — 88312 SPECIAL STAINS GROUP 1: CPT | Performed by: PATHOLOGY

## 2024-02-21 PROCEDURE — 88313 SPECIAL STAINS GROUP 2: CPT | Performed by: PATHOLOGY

## 2024-02-21 PROCEDURE — 88342 IMHCHEM/IMCYTCHM 1ST ANTB: CPT | Performed by: PATHOLOGY

## 2024-02-21 PROCEDURE — 88341 IMHCHEM/IMCYTCHM EA ADD ANTB: CPT | Performed by: PATHOLOGY

## 2024-02-21 RX ORDER — PANTOPRAZOLE SODIUM 40 MG/1
40 TABLET, DELAYED RELEASE ORAL DAILY
Qty: 90 TABLET | Refills: 1 | Status: SHIPPED | OUTPATIENT
Start: 2024-02-21

## 2024-02-21 NOTE — RESULT ENCOUNTER NOTE
Please call patient biopsy was okay.  Repeat screening colonoscopy in 10 years.  EGD biopsy consistent with inflammation and erosions in the esophagus from reflux.  Take PPI as discussed.  If any GI symptoms then patient should call our office for evaluation accordingly.

## 2025-06-18 ENCOUNTER — OFFICE VISIT (OUTPATIENT)
Dept: PULMONOLOGY | Facility: CLINIC | Age: 75
End: 2025-06-18
Payer: MEDICARE

## 2025-06-18 VITALS
SYSTOLIC BLOOD PRESSURE: 138 MMHG | DIASTOLIC BLOOD PRESSURE: 80 MMHG | HEIGHT: 61 IN | TEMPERATURE: 97.9 F | WEIGHT: 148 LBS | HEART RATE: 92 BPM | OXYGEN SATURATION: 95 % | BODY MASS INDEX: 27.94 KG/M2

## 2025-06-18 DIAGNOSIS — J18.9 PNEUMONIA OF RIGHT MIDDLE LOBE DUE TO INFECTIOUS ORGANISM: Primary | ICD-10-CM

## 2025-06-18 DIAGNOSIS — J45.20 MILD INTERMITTENT ASTHMA WITHOUT COMPLICATION: ICD-10-CM

## 2025-06-18 PROCEDURE — 99204 OFFICE O/P NEW MOD 45 MIN: CPT | Performed by: INTERNAL MEDICINE

## 2025-06-18 RX ORDER — ROSUVASTATIN CALCIUM 10 MG/1
10 TABLET, COATED ORAL DAILY
COMMUNITY
Start: 2025-06-05

## 2025-06-18 RX ORDER — AMOXICILLIN AND CLAVULANATE POTASSIUM 500; 125 MG/1; MG/1
1 TABLET, FILM COATED ORAL 2 TIMES DAILY
COMMUNITY
Start: 2025-06-17 | End: 2025-06-22

## 2025-06-18 NOTE — PROGRESS NOTES
Consultation - Pulmonary Medicine   Lula Nguyen 75 y.o. female MRN: [id number]    Physician Requesting Consult: Rachael Hernandez MD    Reason for Consult: Pneumonia    Pneumonia of right middle lobe due to infectious organism  Unfortunately I did not have access to review the imaging studies but based on report she might have right middle lobe pneumonia, she is low risk with no history of smoking, treated with 7 days course of Augmentin, she had some generalized weakness and fatigue and started to feel better, no other alarming symptoms.  No fever or chills or chest pain.  Unfortunately the repeat chest x-ray was done so soon, after only 7 days from therapy so probably it is radiographic lack of improvement but clinically she feels slightly better.  At this point I feel adding another course of antibiotics would not help, I told her to stop the Augmentin and monitor her symptoms, if she develops significant respiratory symptoms or fever or chills to let me know but otherwise I will decide doing a follow-up imaging studies in 6-8 weeks whether CT scan or chest x-ray after I get a chance to review myself.  I explained to the patient and her daughter that I can probably pull the images from [facility] system and reviewed and I will let her know my assessment and recommendations.  She agreed with the plan.    After reviewing the images I decided to repeat CT scan in 6 weeks months or earlier if she develops some symptoms.    I will call the patient with the results at that time and also I sent her a message about my evaluation and decision to repeat CT scan.    Asthma  Mild seasonal asthma response to Pulmicort, continue as needed and follow-up with PCP.      Return if symptoms worsen or fail to improve.    Diagnoses and all orders for this visit:    Pneumonia of right middle lobe due to infectious organism  -     CT chest without contrast; Future    Mild intermittent asthma without complication    Other orders  -      amoxicillin-clavulanate (AUGMENTIN) 500-125 mg per tablet; Take 1 tablet by mouth 2 (two) times a day  -     rosuvastatin (CRESTOR) 10 MG tablet; Take 10 mg by mouth daily      ______________________________________________________________________    HPI:    Lula Nguyen is a 75 y.o. female who presents for evaluation of recurrent pneumonia.    Patient has history of allergic asthmatic bronchitis, usually during allergy season, she uses as needed Pulmicort, otherwise she has no respiratory symptoms.  On 6/9/2025 she had cardiac CT scan for calcium scoring that showed right middle lobe opacity, at that time she did not have any complaints then she developed fatigue and generalized weakness so she was treated as pneumonia with Augmentin for 7 days.  She denies any chest pain or shortness of breath or cough or sputum production, denies fever but she had some chills.  Patient completed 7 days of Augmentin and then she had a repeat chest x-ray yesterday that showed the same opacity so she was prescribed another 10 days of Augmentin.  She took only 1 pill.  She still has some fatigue but better from before, denies fever or chills, denies chest pain, denies shortness of breath or cough.    She denies GERD or dysphagia or aspiration.  Denies allergic rhinitis.  She denies history of recurrent infections.     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  She never smoked cigarettes.    Review of Systems:  Review of Systems   Constitutional:  Positive for fatigue.   HENT: Negative.     Eyes: Negative.    Respiratory: Negative.     Cardiovascular: Negative.    Gastrointestinal: Negative.    Endocrine: Negative.    Genitourinary: Negative.    Musculoskeletal: Negative.    Skin: Negative.    Allergic/Immunologic: Negative.    Neurological: Negative.    Hematological: Negative.    Psychiatric/Behavioral: Negative.       Aside from what is  mentioned in the HPI, the review of systems otherwise negative.    Current Medications:    Current Outpatient Medications:     albuterol (PROVENTIL HFA,VENTOLIN HFA) 90 mcg/act inhaler, Inhale 2 puffs every 6 (six) hours as needed, Disp: , Rfl:     amoxicillin-clavulanate (AUGMENTIN) 500-125 mg per tablet, Take 1 tablet by mouth 2 (two) times a day, Disp: , Rfl:     budesonide (PULMICORT) 90 MCG/ACT inhaler, Inhale 1 puff in the morning and 1 puff in the evening., Disp: , Rfl:     CALCIUM-MAGNESIUM-ZINC PO, Take by mouth in the morning., Disp: , Rfl:     fluticasone (FLONASE) 50 mcg/act nasal spray, 1 spray into each nostril in the morning., Disp: , Rfl:     multivitamin (THERAGRAN) TABS, Take 1 tablet by mouth in the morning., Disp: , Rfl:     rosuvastatin (CRESTOR) 10 MG tablet, Take 10 mg by mouth daily, Disp: , Rfl:     famotidine (PEPCID) 10 mg tablet, Take by mouth daily (Patient not taking: Reported on 6/18/2025), Disp: , Rfl:     montelukast (SINGULAIR) 10 mg tablet, Take 10 mg by mouth daily at bedtime (Patient not taking: Reported on 6/18/2025), Disp: , Rfl:     pantoprazole (PROTONIX) 40 mg tablet, TAKE 1 TABLET(40 MG) BY MOUTH DAILY (Patient not taking: Reported on 6/18/2025), Disp: 90 tablet, Rfl: 1    Historical Information   Past Medical History:   Diagnosis Date    Asthma     Breathing difficulty     Breathing issue in intra op due to asthma- general anes related    Colon polyp     Epigastric pain     GERD (gastroesophageal reflux disease)     History of Gil's esophagus     Hx of nasal polyp     Nasal polyps     Post-nasal drip      Past Surgical History:   Procedure Laterality Date    BREAST IMPLANT      CHOLECYSTECTOMY      COLONOSCOPY      KNEE SURGERY Right     withhardware= pin    SINUS SURGERY      avoid left nares if nasal airway needed    SKIN BIOPSY      UPPER GASTROINTESTINAL ENDOSCOPY      WISDOM TOOTH EXTRACTION       Social History   Social History     Tobacco Use   Smoking Status  "Never   Smokeless Tobacco Never       Occupational history:  No occupational exposure    Family History:   No family history on file.  No pertinent family history      PhysicalExamination:  Vitals:   /80 (BP Location: Left arm, Patient Position: Sitting, Cuff Size: Standard)   Pulse 92   Temp 97.9 °F (36.6 °C) (Tympanic)   Ht 5' 1\" (1.549 m)   Wt 67.1 kg (148 lb)   SpO2 95%   BMI 27.96 kg/m²     Gen:  Comfortable on room air.  No conversational dyspnea  HEENT:  Conjugate gaze.  sclerae anicteric.  Oropharynx moist  Neck: Trachea is midline. No JVD. No adenopathy  Chest: Equal breath sounds and clear to auscultation bilaterally although possible mild crackles at the right base anteriorly.  No wheezing.  Good air movement.  Cardiac: S1-S2 regular. no murmur  Abdomen:  benign  Extremities: No edema  Neuro:  Normal speech and mentation      Diagnostic Data:  Labs:  I personally reviewed the most recent laboratory data pertinent to today's visit    No results found for: \"WBC\", \"HGB\", \"HCT\", \"MCV\", \"PLT\"  Lab Results   Component Value Date    CALCIUM 9.5 06/03/2025    K 4.4 06/03/2025    CO2 28 06/03/2025     06/03/2025    BUN 19 06/03/2025    CREATININE 0.81 06/03/2025     No results found for: \"IGE\"  Lab Results   Component Value Date    ALT 18 06/03/2025    AST 20 06/03/2025    ALKPHOS 70 06/03/2025         Imaging:  I personally reviewed the images on the PAC system pertinent to today's visit    Chest x-ray from [facility] on 6/17/2025 report:IMPRESSION:   Findings are compatible with right middle lobe pneumonitis         Cardiac CT scan from [facility] on 6/9/2025 report:Impression:   1. Agatston calcium score 2.8.   2. Focal consolidation within the lateral right middle lobe concerning for   possible pneumonia, clinical correlation needed.   3. Trace right pleural effusion.       Addendum:  I called [facility] radiology and requested for the images to be transferred to our system, I reviewed " myself.    Cardiac CT scan 6/9/25: Dependent groundglass opacification in the right middle lobe, no endobronchial lesions or obstruction, trace right-sided pleural effusion, mild to moderate hiatal hernia    Chest x-ray reviewed on PACS 6/17/25: Right middle lobe groundglass opacification    Chris Oliva MD

## 2025-06-18 NOTE — ASSESSMENT & PLAN NOTE
Unfortunately I did not have access to review the imaging studies but based on report she might have right middle lobe pneumonia, she is low risk with no history of smoking, treated with 7 days course of Augmentin, she had some generalized weakness and fatigue and started to feel better, no other alarming symptoms.  No fever or chills or chest pain.  Unfortunately the repeat chest x-ray was done so soon, after only 7 days from therapy so probably it is radiographic lack of improvement but clinically she feels slightly better.  At this point I feel adding another course of antibiotics would not help, I told her to stop the Augmentin and monitor her symptoms, if she develops significant respiratory symptoms or fever or chills to let me know but otherwise I will decide doing a follow-up imaging studies in 6-8 weeks whether CT scan or chest x-ray after I get a chance to review myself.  I explained to the patient and her daughter that I can probably pull the images from [facility] system and reviewed and I will let her know my assessment and recommendations.  She agreed with the plan.    After reviewing the images I decided to repeat CT scan in 6 weeks months or earlier if she develops some symptoms.    I will call the patient with the results at that time and also I sent her a message about my evaluation and decision to repeat CT scan.

## 2025-07-18 PROBLEM — J18.9 PNEUMONIA OF RIGHT MIDDLE LOBE DUE TO INFECTIOUS ORGANISM: Status: RESOLVED | Noted: 2025-06-18 | Resolved: 2025-07-18

## 2025-08-06 ENCOUNTER — HOSPITAL ENCOUNTER (OUTPATIENT)
Dept: RADIOLOGY | Facility: HOSPITAL | Age: 75
Discharge: HOME | End: 2025-08-06
Payer: MEDICARE

## 2025-08-06 DIAGNOSIS — J18.9 PNEUMONIA OF RIGHT MIDDLE LOBE DUE TO INFECTIOUS ORGANISM: ICD-10-CM

## 2025-08-06 PROCEDURE — 71250 CT THORAX DX C-: CPT

## 2025-08-13 ENCOUNTER — PATIENT MESSAGE (OUTPATIENT)
Dept: PULMONOLOGY | Facility: CLINIC | Age: 75
End: 2025-08-13